# Patient Record
Sex: MALE | Race: WHITE | Employment: FULL TIME | URBAN - METROPOLITAN AREA
[De-identification: names, ages, dates, MRNs, and addresses within clinical notes are randomized per-mention and may not be internally consistent; named-entity substitution may affect disease eponyms.]

---

## 2017-11-03 ENCOUNTER — TELEPHONE (OUTPATIENT)
Dept: SURGERY | Age: 32
End: 2017-11-03

## 2017-11-06 ENCOUNTER — OFFICE VISIT (OUTPATIENT)
Dept: SURGERY | Age: 32
End: 2017-11-06

## 2017-11-06 VITALS — HEIGHT: 66 IN | BODY MASS INDEX: 27.64 KG/M2 | WEIGHT: 172 LBS

## 2017-11-06 DIAGNOSIS — K64.8 INTERNAL AND EXTERNAL PROLAPSED HEMORRHOIDS: Primary | ICD-10-CM

## 2017-11-06 RX ORDER — HYDROCORTISONE ACETATE 25 MG/1
SUPPOSITORY RECTAL
Refills: 2 | COMMUNITY
Start: 2017-11-04 | End: 2017-12-15

## 2017-11-06 NOTE — PROGRESS NOTES
[facility]  Colon and Rectal Surgery  [address] [place] [address]              Colon and Rectal Surgery          Patient: Sameer Colorado  MRN: [id number]  Date: 11/6/2017     Age:  27 y.o.,      Sex: male    YOB: 1985      Subjective     is an 27 y.o. male who presents for management of his chronic hemorrhoid disease. His current symptoms include progressive tender swelling and pain along with intermittent bright red anal outlet bleeding.  reports symptoms have presented for close to 20 years. He previously underwent a series of ligation therapy without success.  denies associated fever. A history of inflammatory bowel disease has not been reported. The patient denies any change in bowel habits, weight changes, nor any abdominal pain. Patient denies constipation, vomiting, diarrhea, loss of appetite, reflux and nausea. Bowel habits are reported as normal without unsual diarrhea, constipation. Colonoscopy was performed on 2006 for the rectal bleeding symptoms and was negative except for the hemorrhoid disease as per the patient. The family history is negative for colon cancer/polyps, other GI malignancies, nor inflammatory bowel diseases. Past Medical History:   Diagnosis Date    Hemorrhoids        Past Surgical History:   Procedure Laterality Date    HX COLONOSCOPY  2006    HX OTHER SURGICAL      hemorrhoid banding       No Known Allergies    Prior to Admission medications    Medication Sig Start Date End Date Taking? Authorizing Provider   ANUCORT-HC 25 mg supp UNW AND I 1 SUP REC BID PRN 11/4/17  Yes Historical Provider   ADDERALL XR 30 mg XR capsule Take 30 mg by mouth daily.          5/29/14  Yes Historical Provider       Current Outpatient Prescriptions   Medication Sig Dispense Refill    ANUCORT-HC 25 mg supp UNW AND I 1 SUP REC BID PRN  2    ADDERALL XR 30 mg XR capsule Take 30 mg by mouth daily. Social History     Social History    Marital status:      Spouse name: N/A    Number of children: N/A    Years of education: N/A     Occupational History    Not on file. Social History Main Topics    Smoking status: Never Smoker    Smokeless tobacco: Never Used    Alcohol use Yes    Drug use: Not on file    Sexual activity: Not on file     Other Topics Concern    Not on file     Social History Narrative       No family history on file. Review of Systems:    A comprehensive review of systems was negative except for that written in the History of Present Illness. Objective:        Visit Vitals    Ht 5' 6\" (1.676 m)    Wt 78 kg (172 lb)    BMI 27.76 kg/m2       Physical Exam:   GENERAL: alert, cooperative, no distress, appears stated age  LUNG: clear to auscultation bilaterally  HEART: regular rate and rhythm, S1, S2 normal, no murmur, click, rub or gallop  EXTREMITIES:  extremities normal, atraumatic, no cyanosis or edema     Anorectal:  With the patient in the prone position the anus appeared abnormal with findings of moderate to severe prolapsed external and internal hemorrhoid disease in all three quadrants, but most severe in the left lateral quadrant. Digital rectal examination revealed increased sphincter tone and squeeze pressure. Palpation revealed No Masses. Anoscopy severely inflamed and edematous internal hemorrhoids in all quadrants. Assessment / Ivanna Gonsalez is an 27 y.o. male with progressive and persistently symptomatic hemorrhoid disease. The patient was eager to proceed with surgical hemorrhoidectomy for definitive cure. I discussed the details of the procedure as well as the risks of surgery including bleeding, infection, pain, anesthesia complications, possibility of recurrent disease, and the possibility of anal incontinence with any anal surgery.   The patient is willing to accept the risks and would like to proceed with the surgery.           Aparna Wooten MD, FACS, FASCRS  Colon and Rectal Surgery  [facility]  Office [phone]  Fax     [phone]  11/6/2017  5:28 PM

## 2017-11-06 NOTE — PATIENT INSTRUCTIONS
If you have any questions or concerns about today's appointment, the verbal and/or written instructions you were given for follow up care, please call our office at [phone]. [facility] - [address], [facility]    [phone] office  [phone] fax      . [facility] - [address], [facility] [address]    [phone] office main line  [phone] main fax                  PATIENT PRE AND POST [address]: [facility]     [address] [facility]  [phone]    Before Surgery Instructions:   1) You must have someone available to drive you to and from your procedure and stay with you for the first 24 hours. 2) It is very important that you have nothing to eat or drink after midnight the night before your surgery. This includes chewing gum or sucking on hard candy. Take only heart, blood pressure and cholesterol medications the morning of surgery with only a sip of water. 3) Please stop taking Plavix 10 days prior to your surgery. Stop taking Coumadin 5 days prior to your surgery. Stop taking all Aspirin or Aspirin containing products 7 days prior to your surgery. Stop taking Advil, Motrin, Aleve, and etc. 3 days prior to your surgery. 4) If you take any diabetic medications please consult with your primary care physician on how to take them on the day of your surgery  5) Please stop all Herbal products 2 weeks prior to your surgery. 6) Please arrive at the hospital 2 hours prior to your surgery, unless you have been otherwise instructed. 7) Patients having an operation on their colon will be given a separate instruction sheet on their Bowel Prep. 8) For any pre-operative work up check in at the main entrance to [facility], and then go to Patient Registration.  These studies are done on a walk in basis they are open from 7:00am to 5:00pm Monday through Friday. 9) Please wash your surgical site the morning of your surgery with soap and water. 10) If you are of child bearing age you will have pregnancy test done the morning of your surgery as soon as you arrive. Surgery Date and Time:      December 15,2017              at     07:30     am        Please check in at [facility], enter through the Emergency Room entrance and go up to the second floor. Please check in by   05:30   am  the day of your surgery. You may contact Sarath Ramirez with any questions at [phone]. After Surgery Instructions: You will need to be seen in the office for a follow-up visit 7-14 days after your surgery. Please call after you have had the procedure to make this appointment. Unless otherwise instructed, you may remove your outer bandage and shower 48 hours after your surgery. If you develop a fever greater than 101, have any significant drainage, bleeding, swelling and/or pus of the wound. Please call our office immediately.

## 2017-11-06 NOTE — MR AVS SNAPSHOT
Visit Information Date & Time Provider Department Dept. Phone Encounter #  
 11/6/2017  2:30 PM London Everett MD [facility] [phone] [id number] Your Appointments 12/26/2017  9:00 AM  
POST OP with London Everett MD  
[address]) Appt Note: hemorrhoidectomy 12-15-17  
 [address] [facility] [address] [facility] [address]  
  
   
 [address] [place] [address] 88 710 [facility] 95 Upcoming Health Maintenance Date Due DTaP/Tdap/Td series (1 - Tdap) 3/24/2004 INFLUENZA AGE 9 TO ADULT 8/1/2017 Allergies as of 11/6/2017  Review Complete On: 11/6/2017 By: Chantal Wu LPN No Known Allergies Current Immunizations  Never Reviewed No immunizations on file. Not reviewed this visit Vitals Height(growth percentile) Weight(growth percentile) BMI Smoking Status 5' 6\" (1.676 m) 172 lb (78 kg) 27.76 kg/m2 Never Smoker BMI and BSA Data Body Mass Index Body Surface Area  
 27.76 kg/m 2 1.91 m 2 Your Updated Medication List  
  
   
This list is accurate as of: 11/6/17  3:10 PM.  Always use your most recent med list.  
  
  
  
  
 ADDERALL XR 30 mg XR capsule Generic drug:  amphetamine-dextroamphetamine XR Take 30 mg by mouth daily. ANUCORT-HC 25 mg Supp Generic drug:  hydrocortisone UNW AND I 1 SUP REC BID PRN Patient Instructions If you have any questions or concerns about today's appointment, the verbal and/or written instructions you were given for follow up care, please call our office at [phone]. [facility] - [facility] [address] [place] [address], [address] 
 
[phone] office [phone] fax Savanna De Leon [facility]  [facility] [address] [place] [address], [address] [facility] 
 
[phone] office main line [phone] main fax PATIENT PRE AND POST OPERATIVE INSTRUCTIONS Hospital: [facility] [address] [place] [address] Road 
[phone] Before Surgery Instructions:  
1) You must have someone available to drive you to and from your procedure and stay with you for the first 24 hours. 2) It is very important that you have nothing to eat or drink after midnight the night before your surgery. This includes chewing gum or sucking on hard candy. Take only heart, blood pressure and cholesterol medications the morning of surgery with only a sip of water. 3) Please stop taking Plavix 10 days prior to your surgery. Stop taking Coumadin 5 days prior to your surgery. Stop taking all Aspirin or Aspirin containing products 7 days prior to your surgery. Stop taking Advil, Motrin, Aleve, and etc. 3 days prior to your surgery. 4) If you take any diabetic medications please consult with your primary care physician on how to take them on the day of your surgery 5) Please stop all Herbal products 2 weeks prior to your surgery. 6) Please arrive at the hospital 2 hours prior to your surgery, unless you have been otherwise instructed. 7) Patients having an operation on their colon will be given a separate instruction sheet on their Bowel Prep. 8) For any pre-operative work up check in at the main entrance to [facility], and then go to Patient Registration. These studies are done on a walk in basis they are open from 7:00am to 5:00pm Monday through Friday. 9) Please wash your surgical site the morning of your surgery with soap and water. 10) If you are of child bearing age you will have pregnancy test done the morning of your surgery as soon as you arrive.  
 
Surgery Date and Time:      December 15,2017              at     07:30     am     
 
Please check in at [facility], enter through the Emergency Room entrance and go up to the second floor. Please check in by   05:30   am  the day of your surgery. You may contact Josh Castro with any questions at [phone]. After Surgery Instructions: You will need to be seen in the office for a follow-up visit 7-14 days after your surgery. Please call after you have had the procedure to make this appointment. Unless otherwise instructed, you may remove your outer bandage and shower 48 hours after your surgery. If you develop a fever greater than 101, have any significant drainage, bleeding, swelling and/or pus of the wound. Please call our office immediately. Introducing [facility]! Rachael Eugene introduces t3n [employer] patient portal. Now you can access parts of your medical record, email your doctor's office, and request medication refills online. 1. In your internet browser, go to [email or web address] 2. Click on the First Time User? Click Here link in the Sign In box. You will see the New Member Sign Up page. 3. Enter your t3n Mag[employer]in Access Code exactly as it appears below. You will not need to use this code after youve completed the sign-up process. If you do not sign up before the expiration date, you must request a new code. · t3n [employer] Access Code: [email or web address] Expires: 2/4/2018  1:45 PM 
 
4. Enter the last four digits of your Social Security Number (xxxx) and Date of Birth (mm/dd/yyyy) as indicated and click Submit. You will be taken to the next sign-up page. 5. Create a blur Groupt ID. This will be your blur Groupt login ID and cannot be changed, so think of one that is secure and easy to remember. 6. Create a blur Groupt password. You can change your password at any time. 7. Enter your Password Reset Question and Answer. This can be used at a later time if you forget your password. 8. Enter your e-mail address. You will receive e-mail notification when new information is available in [address] 9. Click Sign Up. You can now view and download portions of your medical record. 10. Click the Download Summary menu link to download a portable copy of your medical information. If you have questions, please visit the Frequently Asked Questions section of the [employer] website. Remember, [employer] is NOT to be used for urgent needs. For medical emergencies, dial 911. Now available from your iPhone and Android! Please provide this summary of care documentation to your next provider. Your primary care clinician is listed as Taylor Scriver. If you have any questions after today's visit, please call [phone].

## 2017-11-06 NOTE — PROGRESS NOTES
Patient presents for evaluation of hemorrhoids. He reports that he has had problems since age 11 with internal hemorrhoids. He has bleeding, pain and itching. He had a colonoscopy in 2006.

## 2017-12-14 ENCOUNTER — ANESTHESIA EVENT (OUTPATIENT)
Dept: SURGERY | Age: 32
End: 2017-12-14
Payer: COMMERCIAL

## 2017-12-15 ENCOUNTER — HOSPITAL ENCOUNTER (OUTPATIENT)
Age: 32
Setting detail: OUTPATIENT SURGERY
Discharge: HOME | End: 2017-12-15
Attending: COLON & RECTAL SURGERY | Admitting: COLON & RECTAL SURGERY
Payer: COMMERCIAL

## 2017-12-15 ENCOUNTER — ANESTHESIA (OUTPATIENT)
Dept: SURGERY | Age: 32
End: 2017-12-15
Payer: COMMERCIAL

## 2017-12-15 VITALS
OXYGEN SATURATION: 100 % | WEIGHT: 163 LBS | HEART RATE: 86 BPM | HEIGHT: 66 IN | DIASTOLIC BLOOD PRESSURE: 94 MMHG | BODY MASS INDEX: 26.2 KG/M2 | SYSTOLIC BLOOD PRESSURE: 142 MMHG | TEMPERATURE: 98.1 F | RESPIRATION RATE: 16 BRPM

## 2017-12-15 PROCEDURE — 74011000250 HC RX REV CODE- 250: Performed by: COLON & RECTAL SURGERY

## 2017-12-15 PROCEDURE — 76210000021 HC REC RM PH II 0.5 TO 1 HR: Performed by: COLON & RECTAL SURGERY

## 2017-12-15 PROCEDURE — 77030011265 HC ELECTRD BLD HEX COVD -A: Performed by: COLON & RECTAL SURGERY

## 2017-12-15 PROCEDURE — 74011000258 HC RX REV CODE- 258

## 2017-12-15 PROCEDURE — 74011250636 HC RX REV CODE- 250/636

## 2017-12-15 PROCEDURE — 88304 TISSUE EXAM BY PATHOLOGIST: CPT | Performed by: COLON & RECTAL SURGERY

## 2017-12-15 PROCEDURE — 74011250637 HC RX REV CODE- 250/637

## 2017-12-15 PROCEDURE — 77030018836 HC SOL IRR NACL ICUM -A: Performed by: COLON & RECTAL SURGERY

## 2017-12-15 PROCEDURE — 74011000250 HC RX REV CODE- 250

## 2017-12-15 PROCEDURE — 76010000149 HC OR TIME 1 TO 1.5 HR: Performed by: COLON & RECTAL SURGERY

## 2017-12-15 PROCEDURE — 74011250637 HC RX REV CODE- 250/637: Performed by: NURSE ANESTHETIST, CERTIFIED REGISTERED

## 2017-12-15 PROCEDURE — 77030032490 HC SLV COMPR SCD KNE COVD -B: Performed by: COLON & RECTAL SURGERY

## 2017-12-15 PROCEDURE — 77030018823 HC SLV COMPR VENO -B: Performed by: COLON & RECTAL SURGERY

## 2017-12-15 PROCEDURE — 77030011640 HC PAD GRND REM COVD -A: Performed by: COLON & RECTAL SURGERY

## 2017-12-15 PROCEDURE — 77030034115 HC SHR ENDO COAG HARM FOCS J&J -F: Performed by: COLON & RECTAL SURGERY

## 2017-12-15 PROCEDURE — 77030013079 HC BLNKT BAIR HGGR 3M -A: Performed by: NURSE ANESTHETIST, CERTIFIED REGISTERED

## 2017-12-15 PROCEDURE — 77030002888 HC SUT CHRMC J&J -A: Performed by: COLON & RECTAL SURGERY

## 2017-12-15 PROCEDURE — 74011250636 HC RX REV CODE- 250/636: Performed by: NURSE ANESTHETIST, CERTIFIED REGISTERED

## 2017-12-15 PROCEDURE — 76060000033 HC ANESTHESIA 1 TO 1.5 HR: Performed by: COLON & RECTAL SURGERY

## 2017-12-15 RX ORDER — ONDANSETRON 2 MG/ML
4 INJECTION INTRAMUSCULAR; INTRAVENOUS ONCE
Status: DISCONTINUED | OUTPATIENT
Start: 2017-12-15 | End: 2017-12-15 | Stop reason: HOSPADM

## 2017-12-15 RX ORDER — OXYCODONE AND ACETAMINOPHEN 5; 325 MG/1; MG/1
1 TABLET ORAL ONCE
Status: COMPLETED | OUTPATIENT
Start: 2017-12-15 | End: 2017-12-15

## 2017-12-15 RX ORDER — FENTANYL CITRATE 50 UG/ML
INJECTION, SOLUTION INTRAMUSCULAR; INTRAVENOUS AS NEEDED
Status: DISCONTINUED | OUTPATIENT
Start: 2017-12-15 | End: 2017-12-15 | Stop reason: HOSPADM

## 2017-12-15 RX ORDER — SODIUM CHLORIDE, SODIUM LACTATE, POTASSIUM CHLORIDE, CALCIUM CHLORIDE 600; 310; 30; 20 MG/100ML; MG/100ML; MG/100ML; MG/100ML
50 INJECTION, SOLUTION INTRAVENOUS CONTINUOUS
Status: DISCONTINUED | OUTPATIENT
Start: 2017-12-16 | End: 2017-12-15 | Stop reason: HOSPADM

## 2017-12-15 RX ORDER — KETOROLAC TROMETHAMINE 30 MG/ML
INJECTION, SOLUTION INTRAMUSCULAR; INTRAVENOUS AS NEEDED
Status: DISCONTINUED | OUTPATIENT
Start: 2017-12-15 | End: 2017-12-15 | Stop reason: HOSPADM

## 2017-12-15 RX ORDER — INSULIN LISPRO 100 [IU]/ML
INJECTION, SOLUTION INTRAVENOUS; SUBCUTANEOUS ONCE
Status: DISCONTINUED | OUTPATIENT
Start: 2017-12-16 | End: 2017-12-15 | Stop reason: HOSPADM

## 2017-12-15 RX ORDER — LIDOCAINE HYDROCHLORIDE 20 MG/ML
INJECTION, SOLUTION EPIDURAL; INFILTRATION; INTRACAUDAL; PERINEURAL AS NEEDED
Status: DISCONTINUED | OUTPATIENT
Start: 2017-12-15 | End: 2017-12-15 | Stop reason: HOSPADM

## 2017-12-15 RX ORDER — SODIUM CHLORIDE 0.9 % (FLUSH) 0.9 %
5-10 SYRINGE (ML) INJECTION AS NEEDED
Status: DISCONTINUED | OUTPATIENT
Start: 2017-12-15 | End: 2017-12-15 | Stop reason: HOSPADM

## 2017-12-15 RX ORDER — HYDROMORPHONE HYDROCHLORIDE 2 MG/ML
0.5 INJECTION, SOLUTION INTRAMUSCULAR; INTRAVENOUS; SUBCUTANEOUS
Status: DISCONTINUED | OUTPATIENT
Start: 2017-12-15 | End: 2017-12-15 | Stop reason: HOSPADM

## 2017-12-15 RX ORDER — FAMOTIDINE 20 MG/1
TABLET, FILM COATED ORAL
Status: COMPLETED
Start: 2017-12-15 | End: 2017-12-15

## 2017-12-15 RX ORDER — OXYCODONE AND ACETAMINOPHEN 5; 325 MG/1; MG/1
1 TABLET ORAL
Qty: 25 TAB | Refills: 0 | Status: SHIPPED | OUTPATIENT
Start: 2017-12-15

## 2017-12-15 RX ORDER — PROPOFOL 10 MG/ML
INJECTION, EMULSION INTRAVENOUS
Status: DISCONTINUED | OUTPATIENT
Start: 2017-12-15 | End: 2017-12-15 | Stop reason: HOSPADM

## 2017-12-15 RX ORDER — PROPOFOL 10 MG/ML
INJECTION, EMULSION INTRAVENOUS AS NEEDED
Status: DISCONTINUED | OUTPATIENT
Start: 2017-12-15 | End: 2017-12-15 | Stop reason: HOSPADM

## 2017-12-15 RX ORDER — SODIUM CHLORIDE, SODIUM LACTATE, POTASSIUM CHLORIDE, CALCIUM CHLORIDE 600; 310; 30; 20 MG/100ML; MG/100ML; MG/100ML; MG/100ML
125 INJECTION, SOLUTION INTRAVENOUS CONTINUOUS
Status: DISCONTINUED | OUTPATIENT
Start: 2017-12-15 | End: 2017-12-15 | Stop reason: HOSPADM

## 2017-12-15 RX ORDER — LIDOCAINE HYDROCHLORIDE AND EPINEPHRINE 10; 10 MG/ML; UG/ML
30 INJECTION, SOLUTION INFILTRATION; PERINEURAL ONCE
Status: COMPLETED | OUTPATIENT
Start: 2017-12-15 | End: 2017-12-15

## 2017-12-15 RX ORDER — NALOXONE HYDROCHLORIDE 0.4 MG/ML
0.04 INJECTION, SOLUTION INTRAMUSCULAR; INTRAVENOUS; SUBCUTANEOUS AS NEEDED
Status: DISCONTINUED | OUTPATIENT
Start: 2017-12-15 | End: 2017-12-15 | Stop reason: HOSPADM

## 2017-12-15 RX ORDER — FAMOTIDINE 20 MG/1
20 TABLET, FILM COATED ORAL ONCE
Status: COMPLETED | OUTPATIENT
Start: 2017-12-16 | End: 2017-12-15

## 2017-12-15 RX ORDER — MIDAZOLAM HYDROCHLORIDE 1 MG/ML
INJECTION, SOLUTION INTRAMUSCULAR; INTRAVENOUS AS NEEDED
Status: DISCONTINUED | OUTPATIENT
Start: 2017-12-15 | End: 2017-12-15 | Stop reason: HOSPADM

## 2017-12-15 RX ORDER — FENTANYL CITRATE 50 UG/ML
50 INJECTION, SOLUTION INTRAMUSCULAR; INTRAVENOUS AS NEEDED
Status: DISCONTINUED | OUTPATIENT
Start: 2017-12-15 | End: 2017-12-15 | Stop reason: HOSPADM

## 2017-12-15 RX ORDER — SODIUM CHLORIDE 0.9 % (FLUSH) 0.9 %
5-10 SYRINGE (ML) INJECTION EVERY 8 HOURS
Status: DISCONTINUED | OUTPATIENT
Start: 2017-12-15 | End: 2017-12-15 | Stop reason: HOSPADM

## 2017-12-15 RX ADMIN — FENTANYL CITRATE 50 MCG: 50 INJECTION, SOLUTION INTRAMUSCULAR; INTRAVENOUS at 07:55

## 2017-12-15 RX ADMIN — FENTANYL CITRATE 50 MCG: 50 INJECTION, SOLUTION INTRAMUSCULAR; INTRAVENOUS at 08:02

## 2017-12-15 RX ADMIN — PROPOFOL 125 MCG/KG/MIN: 10 INJECTION, EMULSION INTRAVENOUS at 08:02

## 2017-12-15 RX ADMIN — OXYCODONE HYDROCHLORIDE AND ACETAMINOPHEN 1 TABLET: 5; 325 TABLET ORAL at 09:44

## 2017-12-15 RX ADMIN — FAMOTIDINE 20 MG: 20 TABLET, FILM COATED ORAL at 06:41

## 2017-12-15 RX ADMIN — SODIUM CHLORIDE, SODIUM LACTATE, POTASSIUM CHLORIDE, AND CALCIUM CHLORIDE 50 ML/HR: 600; 310; 30; 20 INJECTION, SOLUTION INTRAVENOUS at 06:36

## 2017-12-15 RX ADMIN — PROPOFOL 30 MG: 10 INJECTION, EMULSION INTRAVENOUS at 08:50

## 2017-12-15 RX ADMIN — LIDOCAINE HYDROCHLORIDE 40 MG: 20 INJECTION, SOLUTION EPIDURAL; INFILTRATION; INTRACAUDAL; PERINEURAL at 08:02

## 2017-12-15 RX ADMIN — MIDAZOLAM HYDROCHLORIDE 2 MG: 1 INJECTION, SOLUTION INTRAMUSCULAR; INTRAVENOUS at 07:54

## 2017-12-15 RX ADMIN — PROPOFOL 30 MG: 10 INJECTION, EMULSION INTRAVENOUS at 08:08

## 2017-12-15 RX ADMIN — KETOROLAC TROMETHAMINE 30 MG: 30 INJECTION, SOLUTION INTRAMUSCULAR; INTRAVENOUS at 08:38

## 2017-12-15 RX ADMIN — PROPOFOL 40 MG: 10 INJECTION, EMULSION INTRAVENOUS at 08:55

## 2017-12-15 NOTE — H&P
[facility]  [address] [place] [address]            Colon and Rectal Surgery History and Physical    Patient: Arpita Puga  MRN: [id number]  SSN: [id number]   YOB: 1985  Age: 27 y.o. Sex: male      is an 27 y.o. male with progressive and persistently symptomatic hemorrhoid disease. He is here for surgical hemorrhoidectomy. Clinical exam showed moderate to severe prolapsed external and internal hemorrhoid disease in all three quadrants. Past Medical History:   Diagnosis Date    Hemorrhoids      Past Surgical History:   Procedure Laterality Date    HX COLONOSCOPY  2006    HX OTHER SURGICAL      hemorrhoid banding     No Known Allergies  Current Facility-Administered Medications   Medication Dose Route Frequency Provider Last Rate Last Dose    [START ON 12/16/2017] lactated Ringers infusion  50 mL/hr IntraVENous CONTINUOUS Xin Garza CRNA 50 mL/hr at 12/15/17 0636 50 mL/hr at 12/15/17 0636    sodium chloride (NS) flush 5-10 mL  5-10 mL IntraVENous Q8H Xin Garza CRNA        sodium chloride (NS) flush 5-10 mL  5-10 mL IntraVENous PRN Xin Garza CRNA        [START ON 12/16/2017] insulin lispro (HUMALOG) injection   SubCUTAneous ONCE Xin Garza CRNA        lidocaine-EPINEPHrine (XYLOCAINE) 1 %-1:100,000 injection 300 mg  30 mL IntraDERMal ONCE Valeri Jarquin MD             Physical Examination    Vitals:    12/13/17 1158 12/15/17 0627   BP:  128/86   Pulse:  65   Resp:  19   Temp:  98.1 °F (36.7 °C)   SpO2:  99%   Weight: 77.1 kg (170 lb) 73.9 kg (163 lb)   Height: 5' 6\" (1.676 m) 5' 6\" (1.676 m)        Physical Exam:   GENERAL: alert, cooperative, no distress, appears stated age  LUNG: clear to auscultation bilaterally  HEART: regular rate and rhythm, S1, S2 normal, no murmur, click, rub or gallop  ABDOMEN: soft, non-tender.  Bowel sounds normal. No masses,  no organomegaly      Assessment and Plan: Ariella Rose is an 27 y.o. male with progressive and persistently symptomatic hemorrhoid disease.      The patient was eager to proceed with surgical hemorrhoidectomy for definitive cure.     I discussed the details of the procedure as well as the risks of surgery including bleeding, infection, pain, anesthesia complications, possibility of recurrent disease, and the possibility of anal incontinence with any anal surgery. The patient is willing to accept the risks and would like to proceed with the surgery. Edgard Cervantes MD, FACS, FASCRS  Colon and Rectal Surgery  [facility]  Office [phone]  Fax     [phone]  12/15/2017  7:49 AM     [address] [employer]

## 2017-12-15 NOTE — IP AVS SNAPSHOT
[address] [place] [address] Linn Sofía Gilbert 
[phone] Patient: Sameer Colorado MRN: [id number] BDO:8/02/1120 About your hospitalization You were admitted on:  December 15, 2017 You last received care in the:  [facility] PHASE 2 RECOVERY You were discharged on:  December 15, 2017 Why you were hospitalized Your primary diagnosis was:  Not on File Things You Need To Do (next 8 weeks) Follow up with Evan Shukla MD  
  
Phone:  [phone] Where:  [facility] [address] Schedule an appointment with Beulah Deng MD as soon as possible for a visit in 2 week(s) Phone:  [phone] Where:  [address] [facility], [address] [address] Tuesday Dec 26, 2017 POST OP with Beulah Deng MD at  9:00 AM  
Where: [address] ([facility]) Discharge Orders None A check rizwana indicates which time of day the medication should be taken. My Medications STOP taking these medications ANUCORT-HC 25 mg Supp Generic drug:  hydrocortisone TAKE these medications as instructed Instructions Each Dose to Equal  
 Morning Noon Evening Bedtime ADDERALL XR 30 mg XR capsule Generic drug:  amphetamine-dextroamphetamine XR Your last dose was: Your next dose is: Take 30 mg by mouth daily. 30 mg  
    
   
   
   
  
 oxyCODONE-acetaminophen 5-325 mg per tablet Commonly known as:  PERCOCET Your last dose was: Your next dose is: Take 1 Tab by mouth every four (4) hours as needed for Pain. Max Daily Amount: 6 Tabs. 1 Tab Where to Get Your Medications Information on where to get these meds will be given to you by the nurse or doctor. ! Ask your nurse or doctor about these medications oxyCODONE-acetaminophen 5-325 mg per tablet Discharge Instructions [employer] [facility] [address], [address] [place], [facility] Anal and Rectal Surgery Discharge Instructions These instructions will cover the most common concerns following surgery on the anal area (including hemorrhoidectomy). If you have further questions or problems, please contact our office. The office is open Monday - Friday 8:30 AM to 4:30 pm. If emergencies arise after business hours, the answering service can contact the on-call physician. The number for the office and answering service is [phone]. Recovery from Anesthesia/Sedation · Do not engage in any activity that requires physical/mental coordination, as the medications may cause drowsiness and dizziness. · Do not drive or operate heavy machinery for 24 hours. · Do not consume alcohol, tranquilizers or sleeping medications for 24 hours. · You must have someone home with you today. Activity · You are advised to go directly home. Restrict your activities and rest for a day. · Resume light to normal activity tomorrow unless instructed differently. · Try to avoid sitting for prolonged periods with pressure on the surgical site. Reclining or sitting to one side is better. It may take several weeks to return to normal activity. Fluids and Diet · Begin with a light diet (soup, toast, crackers). · Unless instructed differently, you may advance to regular food. · Avoid heavy, greasy and spicy foods. · Drink plenty of fluids daily. Follow-Up Unless you already have an appointment, call the office ([phone]) when you get home to make an appointment to see your surgeon approximately 2 weeks after discharge from the hospital. Also call the office for: · Fever greater than 101.5 F 
· Inability to urinate for more than 8 hours · Warmth, redness, or foul-smelling drainage from around the incision · Sudden loss of bright red blood from surgical site or rectum (greater than 1/2 cup). Pain Discomfort is common after surgery in this area. Soaking in a Sitz bath or tub of warn water may help with pain. · A narcotic pain medication has been prescribed by your surgeon. Take as directed. · Use your over-the-counter pain medication such as Ibuprofen when you have finished the prescription provided by your surgeon or if you feel that the pain can be managed with these medications. Bowel Regimen Many people find it helps to take fiber supplementation and a stool softener to regulate the bowels after surgery, especially if narcotic pain medication is being used. Colace or Miralax are options. You can take Milk of Magnesia one to two tablespoons every 4-6 hours as needed for more severe constipation. Wound Care It is common to have some clear or light yellow or pink-tinged drainage from the incision. Thick, foul-smelling drainage can be a sign of infection. Call the office if this occurs. It is also common to have small amounts of bleeding with bowel movements. Keep the surgical area clean by washing in the shower, tub or sitz bath after bowel movements. Gauze pads or a menstrual pad cab be used to protect clothing from drainage. It is fine to wash the wound in the shower. Avoid putting soap directly into the incision. · Remove packing and/or dressing  from incision in 24 hours. · It is important to take tub or sitz baths at least 4 times daily soaking at least 15 minutes each time. Try to take these baths especially after bowel movements. · Keep wound or incision covered with antibiotic ointment and dry gauze after each baths. Please contact our office for any concerns or questions. Kenny Dockery MD, FACS, FASCRS Colon and Rectal Surgery [facility] Office [phone] Fax     [phone] These instructions have been reviewed with me. I understand the above instructions and have no further questions. Responsible Party Signature: ______________________________________ Date: December 15, 2017 Nurse's Signature: ______________________________________ Date: December 15, 2017 Introducing [facility]! Dear Felecia Rodriguez: Thank you for requesting a [employer] account. Our records indicate that you already have an active [employer] account. You can access your account anytime at [email or web address] Did you know that you can access your hospital and ER discharge instructions at any time in [employer]? You can also review all of your test results from your hospital stay or ER visit. Additional Information If you have questions, please visit the Frequently Asked Questions section of the [employer] website at [email or web address] Remember, [employer] is NOT to be used for urgent needs. For medical emergencies, dial 911. Now available from your iPhone and Android! Providers Seen During Your Hospitalization Provider Specialty Primary office phone Vanessa Bates MD Colon and Rectal Surgery [phone] Your Primary Care Physician (PCP) Primary Care Physician Office Phone Office Fax Shai Castillo [phone] You are allergic to the following No active allergies Recent Documentation Height Weight BMI Smoking Status 1.676 m 73.9 kg 26.31 kg/m2 Never Smoker Emergency Contacts Name Discharge Info Relation Home Work Mobile Luige Tito 10 CAREGIVER [3] Spouse [3]   [phone] Patient Belongings  The following personal items are in your possession at time of discharge: 
  Dental Appliances: None  Visual Aid: Glasses      Home Medications: None   Jewelry: None  Clothing: Shirt, Socks, Sweater, Footwear, Undergarments, Pants (everything given to wife)    Other Valuables: Eyeglasses Please provide this summary of care documentation to your next provider. Signatures-by signing, you are acknowledging that this After Visit Summary has been reviewed with you and you have received a copy. Patient Signature:  ____________________________________________________________ Date:  ____________________________________________________________  
  
Burlene Eric Provider Signature:  ____________________________________________________________ Date:  ____________________________________________________________

## 2017-12-15 NOTE — OP NOTES
COLON AND RECTAL SURGERY OPERATIVE NOTE            Procedure Date: 12/15/2017    Indications: Symptomatic hemorrhoid disease    Pre-operative Diagnosis: Symptomatic hemorrhoid disease    Post-operative Diagnosis: Symptomatic severe prolapsed external and internal hemorrhoid disease     Title of Procedure:  Complex 3 Quadrant External and Internal Hemorrhoidectomy     Surgeon(s): Shital Moya MD    Assistants: Circ-1: Levi Messina  Scrub RN-1: Wendy Thibodeaux RN  Surg Asst-1: Janice Christian    Anesthesiologist: Anesthesiologist: Claudia Morton MD  CRNA: Iglesia Kennedy CRNA    Anesthesia: MAC    ASA Class: ASA 1 - Normal, healthy patient      Indication for surgery:   The patient is a 27 y.o., [facility] male who was recently seen in clinic and was noted to have symptomatic severe hemorrhoid disease. Due to the severity of the disease process, surgical hemorrhoidectomy was discussed for more definitive management. The patient was informed of the indication for the procedure as well as the risk and complications. I discussed the details of the procedure as well as the risks of surgery including bleeding, infection, pain, anesthesia complications, possibility of recurrent disease, and the possibility of anal incontinence with any anal surgery. The patient demonstrated good understanding of surgical considerations, risks, benefits and alternatives and was willing to accept the risks of surgery. The patient elected to proceed with surgery, and therefore the surgery was scheduled. Procedure    Findings:  Right anterior quadrant -  Moderate external and internal hemorrhoid disease                     Right posterior quadrant -  Severe prolapsed external and internal hemorrhoid disease                     Left lateral quadrant -  Severe prolapsed external and internal hemorrhoid disease     Procedure Details:    The patient was brought to the operating room and placed on the operating room table in the prone position after MAC anesthesia was successfully achieved by the Anesthesiology devante. The safety strap was carefully secured and pressure points were carefully padded. The patient was then prepped and draped in the standard sterile fashion. The pneumatic compression boots in the lower extremity were placed prior to surgery. I next injected 20 mL of 1% lidocaine with epinephrine at the operative sites for satisfactory local anesthesia. With the aid of the Jackson retractor, the anal canal was thoroughly evaluated. The patient had the symptomatic hemorrhoid disease as listed in the Findings section above. No other abnormalities were noted. I proceeded with the surgical hemorrhoidectomy in the following fashion for the left lateral quadrant hemorrhoid disease. An inverted \"V\" incision was made sharply encompassing the diseased external hemorrhoidal tissues. This was excised sharply and with the aid of the electrocautery for hemostasis from the subcutaneous external sphincter muscle layer. In continuity, all of the internal hemorrhoidal tissues were excised from the internal sphincter muscle layer with the use of the Harmonic Focus. Care was taken to avoid any injury to the sphincter muscle layers. The mucosal defect was then closed with a 3-0 Chromic suture in the running locked fashion. The anoderm and the perianal skin defects were closed with the same suture, but in a simple running fashion. Next the hemorrhoid disease in the right posterior quadrant and right anterior quadrant were surgically excised in similar fashion. The anal canal was then irrigated with sterile saline, and hemostasis was noted to be complete. I next injected approximately 15 mL of 0.25% marcaine at the operative sites for post operative analgesia. A sterile dry dressing was next placed at the anal verge. The patient tolerated the procedure well and sent to the recovery room in good condition.  Needle and sponge counts were correct.        Estimated Blood Loss:  5 mL           Drains: None           Total IV Fluids: 700 mL           Specimens: Sent - Left lateral, Right posterior and anterior Hemorrhoids to Pathology                  Complications: None             Disposition: aroused from sedation, and taken to the recovery room in a stable condition           Condition: good    Surgeons Signature:       Micheal Marsh MD, FACS, FASCRS  Colon and Rectal Surgery  [facility]  Office [phone]  Fax     [phone]  12/15/2017  9:24 AM

## 2017-12-15 NOTE — DISCHARGE INSTRUCTIONS
[facility]  [address] [place] [address], [address] [facility]            Anal and Rectal Surgery Discharge Instructions    These instructions will cover the most common concerns following surgery on the anal area (including hemorrhoidectomy). If you have further questions or problems, please contact our office. The office is open Monday - Friday 8:30 AM to 4:30 pm. If emergencies arise after business hours, the answering service can contact the on-call physician. The number for the office and answering service is [phone]. Recovery from Anesthesia/Sedation  · Do not engage in any activity that requires physical/mental coordination, as the medications may cause drowsiness and dizziness. · Do not drive or operate heavy machinery for 24 hours. · Do not consume alcohol, tranquilizers or sleeping medications for 24 hours. · You must have someone home with you today. Activity  · You are advised to go directly home. Restrict your activities and rest for a day. · Resume light to normal activity tomorrow unless instructed differently. · Try to avoid sitting for prolonged periods with pressure on the surgical site. Reclining or sitting to one side is better. It may take several weeks to return to normal activity. Fluids and Diet  · Begin with a light diet (soup, toast, crackers). · Unless instructed differently, you may advance to regular food. · Avoid heavy, greasy and spicy foods. · Drink plenty of fluids daily.     Follow-Up  Unless you already have an appointment, call the office ([phone]) when you get home to make an appointment to see your surgeon approximately 2 weeks after discharge from the hospital. Also call the office for:  · Fever greater than 101.5 F  · Inability to urinate for more than 8 hours  · Warmth, redness, or foul-smelling drainage from around the incision  · Sudden loss of bright red blood from surgical site or rectum (greater than 1/2 cup).    Pain  Discomfort is common after surgery in this area. Soaking in a Sitz bath or tub of warn water may help with pain. · A narcotic pain medication has been prescribed by your surgeon. Take as directed. · Use your over-the-counter pain medication such as Ibuprofen when you have finished the prescription provided by your surgeon or if you feel that the pain can be managed with these medications. Bowel Regimen  Many people find it helps to take fiber supplementation and a stool softener to regulate the bowels after surgery, especially if narcotic pain medication is being used. Colace or Miralax are options. You can take Milk of Magnesia one to two tablespoons every 4-6 hours as needed for more severe constipation. Wound Care  It is common to have some clear or light yellow or pink-tinged drainage from the incision. Thick, foul-smelling drainage can be a sign of infection. Call the office if this occurs. It is also common to have small amounts of bleeding with bowel movements. Keep the surgical area clean by washing in the shower, tub or sitz bath after bowel movements. Gauze pads or a menstrual pad cab be used to protect clothing from drainage. It is fine to wash the wound in the shower. Avoid putting soap directly into the incision. · Remove packing and/or dressing  from incision in 24 hours. · It is important to take tub or sitz baths at least 4 times daily soaking at least 15 minutes each time. Try to take these baths especially after bowel movements. · Keep wound or incision covered with antibiotic ointment and dry gauze after each baths. Please contact our office for any concerns or questions. Isael Urbano MD, FACS, FASCRS  Colon and Rectal Surgery  [facility]  Office [phone]  Fax     [phone]      These instructions have been reviewed with me. I understand the above instructions and have no further questions.     Responsible Party Signature: ______________________________________    Date: December 15, 2017    Nurse's Signature: ______________________________________    Date: December 15, 2017

## 2017-12-15 NOTE — PROGRESS NOTES
conducted a pre-surgery visit with Elroy Cedillo, who is a 27 y.o.,male. The  provided the following Interventions:  Initiated a relationship of care and support. Offered prayer and assurance of continued prayers on patient's behalf. Plan:  Chaplains will continue to follow and will provide pastoral care on an as needed/requested basis.  recommends bedside caregivers page  on duty if patient shows signs of acute spiritual or emotional distress.     Anitra Savage   Spiritual Care   [phone]

## 2017-12-15 NOTE — ANESTHESIA POSTPROCEDURE EVALUATION
Post-Anesthesia Evaluation & Assessment    Visit Vitals    BP (!) 142/94    Pulse 86    Temp 36.7 °C (98.1 °F)    Resp 16    Ht 5' 6\" (1.676 m)    Wt 73.9 kg (163 lb)    SpO2 100%    BMI 26.31 kg/m2       Nausea/Vomiting: no nausea    Pain score (VAS): 4    Post-operative hydration adequate.     Mental status & Level of consciousness: orientation per pre-anesthetic level    Neurological status: moves all extremities, sensation grossly intact    Pulmonary status: airway patent, no supplemental oxygen required    Complications related to anesthesia: none    Additional comments:        Peter Gandhi CRNA  December 15, 2017

## 2017-12-15 NOTE — ANESTHESIA PREPROCEDURE EVALUATION
Anesthetic History               Review of Systems / Medical History  Patient summary reviewed and pertinent labs reviewed    Pulmonary                   Neuro/Psych              Cardiovascular                  Exercise tolerance: >4 METS     GI/Hepatic/Renal                Endo/Other             Other Findings   Comments:   Risk Factors for Postoperative nausea/vomiting:       History of postoperative nausea/vomiting? NO       Female? NO       Motion sickness? NO       Intended opioid administration for postoperative analgesia? YES      Smoking Abstinence  Current Smoker? NO  Elective Surgery? YES  Seen preoperatively by anesthesiologist or proxy prior to day of surgery? YES  Pt abstained from smoking 24 hours prior to anesthesia?  N/A         Physical Exam    Airway  Mallampati: II  TM Distance: > 6 cm  Neck ROM: normal range of motion   Mouth opening: Normal     Cardiovascular    Rhythm: regular  Rate: normal         Dental  No notable dental hx       Pulmonary  Breath sounds clear to auscultation               Abdominal  GI exam deferred       Other Findings            Anesthetic Plan    ASA: 1  Anesthesia type: MAC          Induction: Intravenous  Anesthetic plan and risks discussed with: Patient

## 2017-12-26 ENCOUNTER — OFFICE VISIT (OUTPATIENT)
Dept: SURGERY | Age: 32
End: 2017-12-26

## 2017-12-26 VITALS
RESPIRATION RATE: 16 BRPM | WEIGHT: 166 LBS | HEART RATE: 66 BPM | SYSTOLIC BLOOD PRESSURE: 136 MMHG | HEIGHT: 66 IN | BODY MASS INDEX: 26.68 KG/M2 | OXYGEN SATURATION: 100 % | DIASTOLIC BLOOD PRESSURE: 68 MMHG | TEMPERATURE: 98.4 F

## 2017-12-26 DIAGNOSIS — K64.8 INTERNAL AND EXTERNAL PROLAPSED HEMORRHOIDS: Primary | ICD-10-CM

## 2017-12-26 NOTE — MR AVS SNAPSHOT
Visit Information Date & Time Provider Department Dept. Phone Encounter #  
 12/26/2017  9:00 AM Porter Patiño MD [facility] [phone] [id number] Upcoming Health Maintenance Date Due DTaP/Tdap/Td series (1 - Tdap) 3/24/2004 Influenza Age 5 to Adult 8/1/2017 Allergies as of 12/26/2017  Review Complete On: 12/26/2017 By: Jamilah Liu LPN No Known Allergies Current Immunizations  Never Reviewed No immunizations on file. Not reviewed this visit Vitals BP Pulse Temp Resp Height(growth percentile) Weight(growth percentile) 136/68 66 98.4 °F (36.9 °C) (Oral) 16 5' 6\" (1.676 m) 166 lb (75.3 kg) SpO2 BMI Smoking Status 100% 26.79 kg/m2 Never Smoker Vitals History BMI and BSA Data Body Mass Index Body Surface Area  
 26.79 kg/m 2 1.87 m 2 Your Updated Medication List  
  
   
This list is accurate as of: 12/26/17  9:42 AM.  Always use your most recent med list.  
  
  
  
  
 ADDERALL XR 30 mg XR capsule Generic drug:  amphetamine-dextroamphetamine XR Take 30 mg by mouth daily. IBUPROFEN PO Take  by mouth. oxyCODONE-acetaminophen 5-325 mg per tablet Commonly known as:  PERCOCET Take 1 Tab by mouth every four (4) hours as needed for Pain. Max Daily Amount: 6 Tabs. Patient Instructions If you have any questions or concerns about today's appointment, the verbal and/or written instructions you were given for follow up care, please call our office at [phone]. [facility] [place] [address] [place] [address], [address] 
 
[phone] office [phone] Introducing [facility]! Dear David Barros: Thank you for requesting a [employer] account. Our records indicate that you already have an active [employer] account. You can access your account anytime at [email or web address] Did you know that you can access your hospital and ER discharge instructions at any time in [employer]? You can also review all of your test results from your hospital stay or ER visit. Additional Information If you have questions, please visit the Frequently Asked Questions section of the [employer] website at [email or web address] Remember, [employer] is NOT to be used for urgent needs. For medical emergencies, dial 911. Now available from your iPhone and Android! Please provide this summary of care documentation to your next provider. Your primary care clinician is listed as Val Chamorro. If you have any questions after today's visit, please call [phone].

## 2017-12-26 NOTE — PROGRESS NOTES
[facility]  [address], [address] [facility]              Patient: Thomas Zepeda  Admitted: (Not on file) MRN: [id number]     Age:  27 y.o.,      Sex: male    YOB: 1985       George Rose is an 27 y.o. male who is status post Complex 3 Quadrant External and Internal Hemorrhoidectomy on 12/25/2017 for symptomatic severe prolapsed external and internal hemorrhoid disease.     He is currently without complaints except for mild post op pain. His bowels are regular, and the patient denies fever. He is experiencing only minimal bleeding. Objective    Vitals:    12/26/17 0913 12/26/17 0916   BP: (!) 157/105 136/68   Pulse: 66    Resp: 16    Temp: 98.4 °F (36.9 °C)    TempSrc: Oral    SpO2: 100%    Weight: 75.3 kg (166 lb)    Height: 5' 6\" (1.676 m)    PainSc:   3        Physical Exam:  General: alert, cooperative, no distress, appears stated age  Anorectal:  With the patient in the prone position the anus appeared within normal limits with healing operative sites. Digital rectal examination revealed Normal sphincter tone and squeeze pressure. Palpation revealed No Masses. Assessment / Plan    Mr. Bentley Kerr is Doing well overall postoperatively. The patient will continue the local wound care until the operative site has completely healed. Return in 3 weeks for follow up.           Jayden Monahan MD, FACS, FASCRS  Colon and Rectal Surgery  [facility]  Office [phone]  Fax     [phone]  12/26/2017  4:29 PM

## 2017-12-26 NOTE — PROGRESS NOTES
Chief Complaint   Patient presents with    Post OP Follow Up     hemmorroidectomy 12/15/17       Patient rates pain 3/10, no constipation last BM noted 12/26/2017, with little blood in stool. 1. Have you been to the ER, urgent care clinic since your last visit? Hospitalized since your last visit? No    2. Have you seen or consulted any other health care providers outside of the [facility] since your last visit? Include any pap smears or colon screening.  No

## 2017-12-26 NOTE — LETTER
NOTIFICATION RETURN TO WORK / SCHOOL 
 
12/26/2017 9:39 AM 
 
Mr. Kim Santillan [address] [facility] [address] To Whom It May Concern: 
 
Kim Santillan is currently under the care of Yeimy Zarco with [facility]. He will return to work/school on: 12/27/17 With frequent bathroom privileges If there are questions or concerns please have the patient contact our office. Sincerely, Oriana Garcia MD

## 2017-12-26 NOTE — PATIENT INSTRUCTIONS
If you have any questions or concerns about today's appointment, the verbal and/or written instructions you were given for follow up care, please call our office at [phone]. [address] [facility] - [address]    [phone] office  [phone]

## 2018-01-23 ENCOUNTER — OFFICE VISIT (OUTPATIENT)
Dept: SURGERY | Age: 33
End: 2018-01-23

## 2018-01-23 VITALS
HEIGHT: 66 IN | RESPIRATION RATE: 18 BRPM | SYSTOLIC BLOOD PRESSURE: 131 MMHG | HEART RATE: 80 BPM | BODY MASS INDEX: 26.2 KG/M2 | OXYGEN SATURATION: 97 % | WEIGHT: 163 LBS | TEMPERATURE: 96.9 F | DIASTOLIC BLOOD PRESSURE: 80 MMHG

## 2018-01-23 DIAGNOSIS — K64.8 INTERNAL AND EXTERNAL PROLAPSED HEMORRHOIDS: Primary | ICD-10-CM

## 2018-01-23 NOTE — PROGRESS NOTES
1. Have you been to the ER, urgent care clinic since your last visit? Hospitalized since your last visit? No    2. Have you seen or consulted any other health care providers outside of the [address] [place] [address] since your last visit? Include any pap smears or colon screening. No     Patient presents for post op from hemorrhoidectomy on 12/15/17.

## 2018-01-23 NOTE — PATIENT INSTRUCTIONS
If you have any questions or concerns about today's appointment, the verbal and/or written instructions you were given for follow up care, please call our office at [phone].     [facility] - [address]    [phone] office  [phone]

## 2018-01-23 NOTE — PROGRESS NOTES
[facility]  [address], [address], [address] [place] [address]              Patient: Alberto Esteban  Admitted: (Not on file) MRN: [id number]     Age:  27 y.o.,      Sex: male    YOB: 1985       George Quintana is an 27 y.o. male who is status post Complex 3 Quadrant External and Internal Hemorrhoidectomy on 12/25/2017 for symptomatic severe prolapsed external and internal hemorrhoid disease.      He is now doing very well and currently without complaints. He denies any pain nor bleeding. He may experience some itching only. His bowels are regular, and the patient denies fever. Objective    Vitals:    01/23/18 1508   BP: 131/80   Pulse: 80   Resp: 18   Temp: 96.9 °F (36.1 °C)   TempSrc: Oral   SpO2: 97%   Weight: 73.9 kg (163 lb)   Height: 5' 6\" (1.676 m)   PainSc:   0 - No pain       Physical Exam:  General: alert, cooperative, no distress, appears stated age  Anorectal:  With the patient in the prone position the anus appeared within normal limits. Digital rectal examination revealed increased sphincter tone and squeeze pressure. Palpation revealed No Masses. Assessment / Plan    Mr. Mai Garcia is doing very well postoperatively. The patient will return as needed for follow up.           Shani Grewal MD, FACS, FASCRS  Colon and Rectal Surgery  [facility]  Office [phone]  Fax     [phone]  1/23/2018  3:28 PM

## 2018-01-23 NOTE — MR AVS SNAPSHOT
[facility] 
 
 
 [address] [place] [address] [address] [facility] [address] [id number] 
[phone] Patient: Sanchez Ashby MRN: [id number] Visit Information Date & Time Provider Department Dept. Phone Encounter #  
 1/23/2018  3:00 PM Rosalba Ibanez MD [facility] [id number] Upcoming Health Maintenance Date Due DTaP/Tdap/Td series (1 - Tdap) 3/24/2004 Influenza Age 5 to Adult 8/1/2017 Allergies as of 1/23/2018  Review Complete On: 1/23/2018 By: Rosalba Ibanez MD  
 No Known Allergies Current Immunizations  Never Reviewed No immunizations on file. Not reviewed this visit You Were Diagnosed With   
  
 Codes Comments Internal and external prolapsed hemorrhoids    -  Primary ICD-10-CM: Z38.6 ICD-9-CM: 455.2, 455.5 Vitals BP Pulse Temp Resp Height(growth percentile) Weight(growth percentile) 131/80 80 96.9 °F (36.1 °C) (Oral) 18 5' 6\" (1.676 m) 163 lb (73.9 kg) SpO2 BMI Smoking Status 97% 26.31 kg/m2 Never Smoker BMI and BSA Data Body Mass Index Body Surface Area  
 26.31 kg/m 2 1.86 m 2 Your Updated Medication List  
  
   
This list is accurate as of: 1/23/18  3:30 PM.  Always use your most recent med list.  
  
  
  
  
 ADDERALL XR 30 mg XR capsule Generic drug:  amphetamine-dextroamphetamine XR Take 30 mg by mouth daily. IBUPROFEN PO Take  by mouth. oxyCODONE-acetaminophen 5-325 mg per tablet Commonly known as:  PERCOCET Take 1 Tab by mouth every four (4) hours as needed for Pain. Max Daily Amount: 6 Tabs. Patient Instructions If you have any questions or concerns about today's appointment, the verbal and/or written instructions you were given for follow up care, please call our office at [phone]. [facility] [place] [address] [facility], [address] [phone] office [phone] Introducing [facility]! Dear John Chandler: Thank you for requesting a Proxible account. Our records indicate that you already have an active Proxible account. You can access your account anytime at [email or web address] Did you know that you can access your hospital and ER discharge instructions at any time in Proxible? You can also review all of your test results from your hospital stay or ER visit. Additional Information If you have questions, please visit the Frequently Asked Questions section of the Proxible website at [email or web address] Remember, Proxible is NOT to be used for urgent needs. For medical emergencies, dial 911. Now available from your iPhone and Android! Please provide this summary of care documentation to your next provider. Your primary care clinician is listed as Maritza Huggins. If you have any questions after today's visit, please call [phone].

## 2022-03-19 PROBLEM — K64.8 INTERNAL AND EXTERNAL PROLAPSED HEMORRHOIDS: Status: ACTIVE | Noted: 2017-11-06

## 2025-04-10 ENCOUNTER — OFFICE VISIT (OUTPATIENT)
Facility: CLINIC | Age: 40
End: 2025-04-10
Payer: COMMERCIAL

## 2025-04-10 VITALS
OXYGEN SATURATION: 99 % | HEART RATE: 66 BPM | RESPIRATION RATE: 18 BRPM | HEIGHT: 66 IN | WEIGHT: 168 LBS | DIASTOLIC BLOOD PRESSURE: 85 MMHG | BODY MASS INDEX: 27 KG/M2 | SYSTOLIC BLOOD PRESSURE: 136 MMHG | TEMPERATURE: 97.3 F

## 2025-04-10 DIAGNOSIS — Z11.59 ENCOUNTER FOR HCV SCREENING TEST FOR LOW RISK PATIENT: Primary | ICD-10-CM

## 2025-04-10 DIAGNOSIS — Z13.220 SCREENING, LIPID: ICD-10-CM

## 2025-04-10 DIAGNOSIS — Z11.4 ENCOUNTER FOR SCREENING FOR HIV: ICD-10-CM

## 2025-04-10 DIAGNOSIS — Z13.1 SCREENING FOR DIABETES MELLITUS (DM): ICD-10-CM

## 2025-04-10 DIAGNOSIS — R53.83 FATIGUE, UNSPECIFIED TYPE: ICD-10-CM

## 2025-04-10 DIAGNOSIS — Z11.59 ENCOUNTER FOR HCV SCREENING TEST FOR LOW RISK PATIENT: ICD-10-CM

## 2025-04-10 DIAGNOSIS — F90.9 ATTENTION DEFICIT HYPERACTIVITY DISORDER (ADHD), UNSPECIFIED ADHD TYPE: ICD-10-CM

## 2025-04-10 DIAGNOSIS — Z13.29 THYROID DISORDER SCREENING: ICD-10-CM

## 2025-04-10 DIAGNOSIS — Z98.890 H/O HEMORRHOIDECTOMY: ICD-10-CM

## 2025-04-10 PROCEDURE — 99386 PREV VISIT NEW AGE 40-64: CPT | Performed by: FAMILY MEDICINE

## 2025-04-10 RX ORDER — DEXTROAMPHETAMINE SACCHARATE, AMPHETAMINE ASPARTATE MONOHYDRATE, DEXTROAMPHETAMINE SULFATE AND AMPHETAMINE SULFATE 7.5; 7.5; 7.5; 7.5 MG/1; MG/1; MG/1; MG/1
1 CAPSULE, EXTENDED RELEASE ORAL DAILY
COMMUNITY
Start: 2025-03-12

## 2025-04-10 SDOH — ECONOMIC STABILITY: FOOD INSECURITY: WITHIN THE PAST 12 MONTHS, YOU WORRIED THAT YOUR FOOD WOULD RUN OUT BEFORE YOU GOT MONEY TO BUY MORE.: NEVER TRUE

## 2025-04-10 SDOH — ECONOMIC STABILITY: FOOD INSECURITY: WITHIN THE PAST 12 MONTHS, THE FOOD YOU BOUGHT JUST DIDN'T LAST AND YOU DIDN'T HAVE MONEY TO GET MORE.: NEVER TRUE

## 2025-04-10 ASSESSMENT — PATIENT HEALTH QUESTIONNAIRE - PHQ9
SUM OF ALL RESPONSES TO PHQ QUESTIONS 1-9: 0
1. LITTLE INTEREST OR PLEASURE IN DOING THINGS: NOT AT ALL
SUM OF ALL RESPONSES TO PHQ QUESTIONS 1-9: 0
2. FEELING DOWN, DEPRESSED OR HOPELESS: NOT AT ALL
SUM OF ALL RESPONSES TO PHQ QUESTIONS 1-9: 0
SUM OF ALL RESPONSES TO PHQ QUESTIONS 1-9: 0

## 2025-04-10 NOTE — PROGRESS NOTES
Mitesh Rojas is a 40 y.o. male who presents to the office today for the following:  No chief complaint on file.      No Known Allergies      Current Outpatient Medications:     amphetamine-dextroamphetamine (ADDERALL XR) 30 MG extended release capsule, Take 1 capsule by mouth daily., Disp: , Rfl:       Past Medical History:   Diagnosis Date    ADHD (attention deficit hyperactivity disorder)        No past surgical history on file.    Social History     Socioeconomic History    Marital status:      Spouse name: Not on file    Number of children: Not on file    Years of education: Not on file    Highest education level: Not on file   Occupational History    Not on file   Tobacco Use    Smoking status: Never    Smokeless tobacco: Never   Vaping Use    Vaping status: Never Used   Substance and Sexual Activity    Alcohol use: Never    Drug use: Never    Sexual activity: Yes   Other Topics Concern    Not on file   Social History Narrative    Not on file     Social Drivers of Health     Financial Resource Strain: Not on file   Food Insecurity: No Food Insecurity (4/10/2025)    Hunger Vital Sign     Worried About Running Out of Food in the Last Year: Never true     Ran Out of Food in the Last Year: Never true   Transportation Needs: No Transportation Needs (4/10/2025)    PRAPARE - Transportation     Lack of Transportation (Medical): No     Lack of Transportation (Non-Medical): No   Physical Activity: Not on file   Stress: Not on file   Social Connections: Not on file   Intimate Partner Violence: Not on file   Housing Stability: Unknown (4/10/2025)    Housing Stability Vital Sign     Unable to Pay for Housing in the Last Year: No     Number of Times Moved in the Last Year: Not on file     Homeless in the Last Year: No     or  Social History     Tobacco Use   Smoking Status Never   Smokeless Tobacco Never       No family history on file.      HPI:  HPI  Patient is new here to establish care.  Denies any medical

## 2025-08-08 LAB
A/G RATIO: 2.4 RATIO (ref 1.1–2.6)
ALBUMIN: 5 G/DL (ref 3.5–5)
ALP BLD-CCNC: 56 U/L (ref 25–115)
ALT SERPL-CCNC: 18 U/L (ref 5–40)
ANION GAP SERPL CALCULATED.3IONS-SCNC: 14 MMOL/L (ref 3–15)
AST SERPL-CCNC: 16 U/L (ref 10–37)
BILIRUB SERPL-MCNC: 0.6 MG/DL (ref 0.2–1.2)
BUN BLDV-MCNC: 10 MG/DL (ref 6–22)
CALCIUM SERPL-MCNC: 9.9 MG/DL (ref 8.4–10.5)
CHLORIDE BLD-SCNC: 104 MMOL/L (ref 98–110)
CHOLESTEROL, TOTAL: 143 MG/DL (ref 110–200)
CHOLESTEROL/HDL RATIO: 3.6 (ref 0–5)
CO2: 25 MMOL/L (ref 20–32)
CREAT SERPL-MCNC: 1.1 MG/DL (ref 0.5–1.2)
ESTIMATED AVERAGE GLUCOSE: 95 MG/DL (ref 91–123)
GFR, ESTIMATED: 82.2 ML/MIN/1.73 SQ.M.
GLOBULIN: 2.1 G/DL (ref 2–4)
GLUCOSE: 82 MG/DL (ref 70–99)
HBA1C MFR BLD: 4.9 % (ref 4.8–5.6)
HCT VFR BLD CALC: 47 % (ref 36.6–51.9)
HDLC SERPL-MCNC: 40 MG/DL
HEMOGLOBIN: 16.2 G/DL (ref 13.2–17.3)
HEPATITIS C ANTIBODY: NORMAL
HIV INTERPRETATION: NORMAL
HIV1/0/2 AB/AG: NON REACTIVE
LDL CHOLESTEROL: 92 MG/DL (ref 50–99)
LDL/HDL RATIO: 2.3
MCH RBC QN AUTO: 29 PG (ref 26–34)
MCHC RBC AUTO-ENTMCNC: 35 G/DL (ref 31–36)
MCV RBC AUTO: 84 FL (ref 80–95)
NON-HDL CHOLESTEROL: 103 MG/DL
PDW BLD-RTO: 13.6 % (ref 10–15.5)
PLATELET # BLD: 244 K/UL (ref 140–440)
PMV BLD AUTO: 10.4 FL (ref 9–13)
POTASSIUM SERPL-SCNC: 4.5 MMOL/L (ref 3.5–5.5)
RBC # BLD: 5.58 M/UL (ref 3.8–5.8)
SODIUM BLD-SCNC: 143 MMOL/L (ref 133–145)
TOTAL PROTEIN: 7.1 G/DL (ref 6.4–8.3)
TRIGL SERPL-MCNC: 51 MG/DL (ref 40–149)
TSH SERPL DL<=0.05 MIU/L-ACNC: 0.6 MCU/ML (ref 0.27–4.2)
VLDLC SERPL CALC-MCNC: 10 MG/DL (ref 8–30)
WBC # BLD: 6 K/UL (ref 4–11)

## (undated) DEVICE — SYR 10ML CTRL LR LCK NSAF LF --

## (undated) DEVICE — Device

## (undated) DEVICE — STERILE LATEX POWDER-FREE SURGICAL GLOVESWITH NITRILE COATING: Brand: PROTEXIS

## (undated) DEVICE — PAD,ABDOMINAL,5"X9",STERILE,LF,1/PK: Brand: MEDLINE INDUSTRIES, INC.

## (undated) DEVICE — KENDALL SCD EXPRESS SLEEVES, KNEE LENGTH, MEDIUM: Brand: KENDALL SCD

## (undated) DEVICE — SKIN MARKER,REGULAR TIP WITH RULER AND LABELS: Brand: DEVON

## (undated) DEVICE — SHEAR HARMONIC FOCUS OEM 9CM --

## (undated) DEVICE — STERILE POLYISOPRENE POWDER-FREE SURGICAL GLOVES: Brand: PROTEXIS

## (undated) DEVICE — SLEEVE COMPR STD 12 IN FOR 165IN CALF COMFORT VENODYNE SYS

## (undated) DEVICE — NEEDLE HYPO 25GA L1.5IN BVL ORIENTED ECLIPSE

## (undated) DEVICE — TRAY PREP DRY W/ PREM GLV 2 APPL 6 SPNG 2 UNDPD 1 OVERWRAP

## (undated) DEVICE — SYR IRR CATH TIP LR ADPT 70ML -- CONVERT TO ITEM 363120

## (undated) DEVICE — NEEDLE HYPO 25GA L1.5IN BLU POLYPR HUB S STL REG BVL STR

## (undated) DEVICE — SUT CHRMC 3-0 27IN SH BRN --

## (undated) DEVICE — FLEXIBLE YANKAUER,MEDIUM TIP, NO VACUUM CONTROL: Brand: ARGYLE

## (undated) DEVICE — REM POLYHESIVE ADULT PATIENT RETURN ELECTRODE: Brand: VALLEYLAB

## (undated) DEVICE — HARMONIC HAND PIECE BLUE --

## (undated) DEVICE — SOLUTION IV 1000ML 0.9% SOD CHL

## (undated) DEVICE — HEX-LOCKING BLADE ELECTRODE: Brand: EDGE

## (undated) DEVICE — SPONGE GZ W4XL4IN COT 12 PLY TYP VII WVN C FLD DSGN